# Patient Record
Sex: FEMALE | Race: WHITE
[De-identification: names, ages, dates, MRNs, and addresses within clinical notes are randomized per-mention and may not be internally consistent; named-entity substitution may affect disease eponyms.]

---

## 2019-12-27 ENCOUNTER — HOSPITAL ENCOUNTER (EMERGENCY)
Dept: HOSPITAL 53 - M ED | Age: 19
Discharge: HOME | End: 2019-12-27
Payer: COMMERCIAL

## 2019-12-27 VITALS — HEIGHT: 65 IN | WEIGHT: 160.94 LBS | BODY MASS INDEX: 26.81 KG/M2

## 2019-12-27 VITALS — DIASTOLIC BLOOD PRESSURE: 58 MMHG | SYSTOLIC BLOOD PRESSURE: 108 MMHG

## 2019-12-27 DIAGNOSIS — A08.11: Primary | ICD-10-CM

## 2019-12-27 LAB
ALBUMIN SERPL BCG-MCNC: 4.6 GM/DL (ref 3.2–5.2)
ALT SERPL W P-5'-P-CCNC: 31 U/L (ref 12–78)
BASOPHILS # BLD AUTO: 0 10^3/UL (ref 0–0.2)
BASOPHILS NFR BLD AUTO: 0.2 % (ref 0–1)
BILIRUB CONJ SERPL-MCNC: 0.2 MG/DL (ref 0–0.2)
BILIRUB SERPL-MCNC: 0.8 MG/DL (ref 0.2–1)
BUN SERPL-MCNC: 16 MG/DL (ref 7–18)
CALCIUM SERPL-MCNC: 10.4 MG/DL (ref 8.5–10.1)
CHLORIDE SERPL-SCNC: 108 MEQ/L (ref 98–107)
CO2 SERPL-SCNC: 22 MEQ/L (ref 21–32)
CREAT SERPL-MCNC: 1.09 MG/DL (ref 0.55–1.3)
EOSINOPHIL # BLD AUTO: 0 10^3/UL (ref 0–0.5)
EOSINOPHIL NFR BLD AUTO: 0.1 % (ref 0–3)
FLUAV RNA UPPER RESP QL NAA+PROBE: NEGATIVE
FLUBV RNA UPPER RESP QL NAA+PROBE: NEGATIVE
GFR SERPL CREATININE-BSD FRML MDRD: > 60 ML/MIN/{1.73_M2} (ref 60–?)
GLUCOSE SERPL-MCNC: 106 MG/DL (ref 70–100)
HCT VFR BLD AUTO: 51.3 % (ref 36–47)
HGB BLD-MCNC: 17.3 G/DL (ref 12–15.5)
LIPASE SERPL-CCNC: 88 U/L (ref 73–393)
LYMPHOCYTES # BLD AUTO: 0.6 10^3/UL (ref 1.5–5)
LYMPHOCYTES NFR BLD AUTO: 4.6 % (ref 24–44)
MCH RBC QN AUTO: 29.9 PG (ref 27–33)
MCHC RBC AUTO-ENTMCNC: 33.7 G/DL (ref 32–36.5)
MCV RBC AUTO: 88.8 FL (ref 80–96)
MONOCYTES # BLD AUTO: 0.4 10^3/UL (ref 0–0.8)
MONOCYTES NFR BLD AUTO: 2.9 % (ref 0–5)
NEUTROPHILS # BLD AUTO: 12.8 10^3/UL (ref 1.5–8.5)
NEUTROPHILS NFR BLD AUTO: 91.8 % (ref 36–66)
PLATELET # BLD AUTO: 295 10^3/UL (ref 150–450)
POTASSIUM SERPL-SCNC: 4.9 MEQ/L (ref 3.5–5.1)
PROT SERPL-MCNC: 9 GM/DL (ref 6.4–8.2)
RBC # BLD AUTO: 5.78 10^6/UL (ref 4–5.4)
SODIUM SERPL-SCNC: 140 MEQ/L (ref 136–145)
WBC # BLD AUTO: 13.9 10^3/UL (ref 4–10)

## 2019-12-27 PROCEDURE — 80048 BASIC METABOLIC PNL TOTAL CA: CPT

## 2019-12-27 PROCEDURE — 99284 EMERGENCY DEPT VISIT MOD MDM: CPT

## 2019-12-27 PROCEDURE — 80076 HEPATIC FUNCTION PANEL: CPT

## 2019-12-27 PROCEDURE — 96361 HYDRATE IV INFUSION ADD-ON: CPT

## 2019-12-27 PROCEDURE — 96374 THER/PROPH/DIAG INJ IV PUSH: CPT

## 2019-12-27 PROCEDURE — 36415 COLL VENOUS BLD VENIPUNCTURE: CPT

## 2019-12-27 PROCEDURE — 85025 COMPLETE CBC W/AUTO DIFF WBC: CPT

## 2019-12-27 PROCEDURE — 87507 IADNA-DNA/RNA PROBE TQ 12-25: CPT

## 2019-12-27 PROCEDURE — 74177 CT ABD & PELVIS W/CONTRAST: CPT

## 2019-12-27 PROCEDURE — 83690 ASSAY OF LIPASE: CPT

## 2019-12-27 PROCEDURE — 87502 INFLUENZA DNA AMP PROBE: CPT

## 2019-12-27 PROCEDURE — 84702 CHORIONIC GONADOTROPIN TEST: CPT

## 2019-12-27 PROCEDURE — 81001 URINALYSIS AUTO W/SCOPE: CPT

## 2019-12-27 NOTE — REP
CT ABDOMEN AND PELVIS WITH IV CONTRAST:

 

TECHNIQUE:  Axial contrast enhanced images from the lung bases to the pubic

symphysis using 100 mL Isovue 370 intravenous contrast material with multiplanar

reformations.

 

 

 

Visualized lung bases are clear. The liver, spleen, adrenals, pancreas and

kidneys are normal in appearance. There is no hydronephrosis. There is no

abdominal aortic aneurysm. There is no adenopathy. There is no free air or free

fluid. No bowel wall thickening is seen. There is no evidence of bowel

obstruction. No anterior abdominal wall defect is seen. No pelvic mass is seen.

Uterus and ovaries appear unremarkable. IUD is seen in the uterus. Urinary

bladder is not well distended and not well evaluated.

 

IMPRESSION:

 

No free air or obstruction. No free fluid. No bowel wall thickening. No acute

findings identified.

 

 

Electronically Signed by

Octavio Saba MD 12/27/2019 05:19 P

## 2020-06-01 ENCOUNTER — HOSPITAL ENCOUNTER (OUTPATIENT)
Dept: HOSPITAL 53 - M WUC | Age: 20
End: 2020-06-01
Attending: PHYSICIAN ASSISTANT
Payer: COMMERCIAL

## 2020-06-01 DIAGNOSIS — R30.0: Primary | ICD-10-CM

## 2021-03-03 ENCOUNTER — HOSPITAL ENCOUNTER (OUTPATIENT)
Dept: HOSPITAL 53 - M LABSMTC | Age: 21
End: 2021-03-03
Attending: PEDIATRICS
Payer: SELF-PAY

## 2021-03-03 DIAGNOSIS — Z11.52: Primary | ICD-10-CM

## 2021-04-29 ENCOUNTER — HOSPITAL ENCOUNTER (EMERGENCY)
Dept: HOSPITAL 53 - M ED | Age: 21
Discharge: HOME | End: 2021-04-29
Payer: COMMERCIAL

## 2021-04-29 VITALS — SYSTOLIC BLOOD PRESSURE: 115 MMHG | DIASTOLIC BLOOD PRESSURE: 81 MMHG

## 2021-04-29 VITALS — BODY MASS INDEX: 26.81 KG/M2 | WEIGHT: 160.94 LBS | HEIGHT: 65 IN
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
## 2021-04-29 DIAGNOSIS — S61.459A: Primary | ICD-10-CM
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
## 2021-04-29 NOTE — ED PDOC
Post-Departure Follow-Up


augmentin sent via digital application on provider [facility]            Apr 29, 2021 18:49

## 2021-04-29 NOTE — REP
INDICATION:

left hand pain s/p crush injury/ lg dog bite



COMPARISON:

None.



TECHNIQUE:

Four views left hand.



FINDINGS:

There is no evidence of acute fracture, dislocation, or intrinsic bone disease.



IMPRESSION:

No fracture or dislocation.





<Electronically signed by Octavio Saba > 04/29/21 [id number]

## 2021-05-15 ENCOUNTER — HOSPITAL ENCOUNTER (OUTPATIENT)
Dept: HOSPITAL 53 - M WUC | Age: 21
End: 2021-05-15
Attending: PHYSICIAN ASSISTANT
Payer: COMMERCIAL

## 2021-05-15 DIAGNOSIS — N39.0: Primary | ICD-10-CM

## 2021-06-12 ENCOUNTER — HOSPITAL ENCOUNTER (EMERGENCY)
Dept: HOSPITAL 53 - M ED | Age: 21
Discharge: HOME | End: 2021-06-12
Payer: COMMERCIAL

## 2021-06-12 VITALS — BODY MASS INDEX: 48.82 KG/M2 | HEIGHT: 65 IN | WEIGHT: 293 LBS

## 2021-06-12 VITALS — DIASTOLIC BLOOD PRESSURE: 88 MMHG | SYSTOLIC BLOOD PRESSURE: 114 MMHG
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
## 2021-06-12 DIAGNOSIS — S93.602A: ICD-10-CM

## 2021-06-12 DIAGNOSIS — S93.402A: Primary | ICD-10-CM
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
## 2021-06-12 DIAGNOSIS — M93.272: ICD-10-CM

## 2021-06-12 NOTE — REP
INDICATION:

ankle injury/limited rom/limited weight baring.



COMPARISON:

Comparison radiographs May 13, 2016..



TECHNIQUE:

Four views of the left ankle are provided.



FINDINGS:

Four views of the left ankle show an intact ankle mortise.  There is some sclerosis

and irregularity in the medial talar dome question osteochondritis dissecans versus

old osteochondral fracture.  No acute fracture is seen.  Achilles tendon margin

appears intact.  No hindfoot or midfoot fracture is appreciated



IMPRESSION:

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  Further evaluation with

MRI suggested.  No acute fracture is seen..





<Electronically signed by Maximino Redmond > 06/12/21 1500

## 2021-06-12 NOTE — REP
INDICATION:

fall/limited weight baring/.



COMPARISON:

None.



TECHNIQUE:

Four views of the left foot are provided.



FINDINGS:

Four views of the left foot demonstrate normal bones, joints, and soft tissues.  No

fracture or subluxation is seen.  No opaque foreign body noted.





IMPRESSION:

Negative left foot series.







<Electronically signed by Maximino Redmond > 06/12/21 [id number]

## 2024-10-21 ENCOUNTER — HOSPITAL ENCOUNTER (OUTPATIENT)
Dept: HOSPITAL 53 - M WHC | Age: 24
End: 2024-10-21
Attending: ADVANCED PRACTICE MIDWIFE
Payer: COMMERCIAL

## 2024-10-21 DIAGNOSIS — Z3A.27: ICD-10-CM

## 2024-10-21 DIAGNOSIS — O09.892: Primary | ICD-10-CM

## 2024-10-30 ENCOUNTER — HOSPITAL ENCOUNTER (OUTPATIENT)
Dept: HOSPITAL 53 - M PLALAB | Age: 24
End: 2024-10-30
Attending: ADVANCED PRACTICE MIDWIFE
Payer: COMMERCIAL

## 2024-10-30 DIAGNOSIS — Z34.82: Primary | ICD-10-CM

## 2024-10-30 LAB
GLUCOSE 1H P 50 G GLC PO SERPL-MCNC: 104 MG/DL (ref ?–140)
HCT VFR BLD AUTO: 39.3 % (ref 36–47)
HGB BLD-MCNC: 13.4 G/DL (ref 12–15.5)
MCH RBC QN AUTO: 31.8 PG (ref 27–33)
MCHC RBC AUTO-ENTMCNC: 34.1 G/DL (ref 32–36.5)
MCV RBC AUTO: 93.3 FL (ref 80–96)
PLATELET # BLD AUTO: 239 10^3/UL (ref 150–450)
RBC # BLD AUTO: 4.21 10^6/UL (ref 4–5.4)
WBC # BLD AUTO: 11.7 10^3/UL (ref 4–10)

## 2024-10-31 LAB
HCV AB SER QL: < 0.02 INDEX (ref ?–0.8)
HIV 1+2 AB+HIV1 P24 AG SERPL QL IA: NEGATIVE

## 2024-11-18 ENCOUNTER — HOSPITAL ENCOUNTER (OUTPATIENT)
Dept: HOSPITAL 53 - M WHC | Age: 24
End: 2024-11-18
Attending: ADVANCED PRACTICE MIDWIFE
Payer: COMMERCIAL

## 2024-11-18 DIAGNOSIS — Z3A.33: ICD-10-CM

## 2024-11-18 DIAGNOSIS — O09.893: Primary | ICD-10-CM

## 2024-12-16 ENCOUNTER — HOSPITAL ENCOUNTER (OUTPATIENT)
Dept: HOSPITAL 53 - M WHC | Age: 24
End: 2024-12-16
Attending: ADVANCED PRACTICE MIDWIFE
Payer: COMMERCIAL

## 2024-12-16 DIAGNOSIS — O09.893: Primary | ICD-10-CM

## 2024-12-16 DIAGNOSIS — Z3A.35: ICD-10-CM

## 2024-12-23 ENCOUNTER — HOSPITAL ENCOUNTER (OUTPATIENT)
Dept: HOSPITAL 53 - M PLALAB | Age: 24
End: 2024-12-23
Attending: OBSTETRICS & GYNECOLOGY
Payer: COMMERCIAL

## 2024-12-23 DIAGNOSIS — Z34.03: Primary | ICD-10-CM

## 2025-01-02 ENCOUNTER — HOSPITAL ENCOUNTER (OUTPATIENT)
Dept: HOSPITAL 53 - M LDO | Age: 25
Discharge: HOME | End: 2025-01-02
Attending: OBSTETRICS & GYNECOLOGY
Payer: COMMERCIAL

## 2025-01-02 VITALS — WEIGHT: 194.67 LBS | HEIGHT: 65 IN | BODY MASS INDEX: 32.43 KG/M2

## 2025-01-02 VITALS — SYSTOLIC BLOOD PRESSURE: 126 MMHG | DIASTOLIC BLOOD PRESSURE: 83 MMHG

## 2025-01-02 DIAGNOSIS — Z3A.37: ICD-10-CM

## 2025-01-02 DIAGNOSIS — O43.193: ICD-10-CM

## 2025-01-02 PROCEDURE — 59412 ANTEPARTUM MANIPULATION: CPT

## 2025-01-02 PROCEDURE — 76815 OB US LIMITED FETUS(S): CPT

## 2025-01-02 PROCEDURE — 59025 FETAL NON-STRESS TEST: CPT
